# Patient Record
Sex: MALE | Race: WHITE | Employment: OTHER | URBAN - METROPOLITAN AREA
[De-identification: names, ages, dates, MRNs, and addresses within clinical notes are randomized per-mention and may not be internally consistent; named-entity substitution may affect disease eponyms.]

---

## 2019-04-22 ENCOUNTER — TRANSFERRED RECORDS (OUTPATIENT)
Dept: HEALTH INFORMATION MANAGEMENT | Facility: CLINIC | Age: 74
End: 2019-04-22

## 2019-04-24 ENCOUNTER — TRANSFERRED RECORDS (OUTPATIENT)
Dept: HEALTH INFORMATION MANAGEMENT | Facility: CLINIC | Age: 74
End: 2019-04-24

## 2019-04-25 ENCOUNTER — TRANSFERRED RECORDS (OUTPATIENT)
Dept: HEALTH INFORMATION MANAGEMENT | Facility: CLINIC | Age: 74
End: 2019-04-25

## 2019-04-26 ENCOUNTER — TRANSFERRED RECORDS (OUTPATIENT)
Dept: HEALTH INFORMATION MANAGEMENT | Facility: CLINIC | Age: 74
End: 2019-04-26

## 2019-05-08 ENCOUNTER — PRE VISIT (OUTPATIENT)
Dept: ONCOLOGY | Facility: CLINIC | Age: 74
End: 2019-05-08

## 2019-05-08 NOTE — TELEPHONE ENCOUNTER
Surgical Oncology/Hepatobiliary Surgery Referral      Referring provider: Dr. Omer - [facility]      Referred to: Dr. Dobson - Surgical oncology - Hepatobiliary Surgeon      Referral Received: 5/6/2019      Evaluation for: IPMN with mural nodule      Oncology provider (if applicable): NA        Imaging:     MRI 4/22/2019     US ABD 4/22/2019    CT 4/22/2019    CT 7/12/2012       Staging complete (if applicable): NA    Additional records needed:     Will need all outside imaging noted above and will need to obtain any EUS/ERCP reports and pathology reports ASAP.     Message sent scheduling to contact patient and arrange for a consult with Dr. Dobson on 5/15/2019 at 1 pm.

## 2019-05-08 NOTE — TELEPHONE ENCOUNTER
ONCOLOGY INTAKE: Records Information      APPT INFORMATION: 5/15/19 - Bronson Villarreal CSC  Referring provider:  Temo Omer  Referring provider s clinic:  [facility]  Reason for visit/diagnosis:  IPMN with Mural nodule     RECORDS INFORMATION:  Were the records received with the referral (via [employer])? Yes (sent to [facility])    Has patient been seen for any external appt for this diagnosis? Yes    If yes, where? [facility]    Has patient had any imaging or procedures outside of [place] for this condition? Yes      If Yes, where? [facility]    ADDITIONAL INFORMATION:  Received 31 pages of records including referral from [facility] - sent to HIM & records team

## 2019-05-10 NOTE — TELEPHONE ENCOUNTER
[facility]. Faxed urgently to HIM ([phone]).    Images resolved and viewable in PACS.

## 2019-05-10 NOTE — TELEPHONE ENCOUNTER
Received fax from intake team that looks like it was [facility] to have [facility]...but all that came through was the cover sheet. Sent an email to intake team, to verify whether they have received Alphonso's records.

## 2019-05-13 PROCEDURE — 00000346 ZZHCL STATISTIC REVIEW OUTSIDE SLIDES TC 88321: Performed by: SURGERY

## 2019-05-14 LAB — COPATH REPORT: NORMAL

## 2019-05-14 ASSESSMENT — ENCOUNTER SYMPTOMS
TINGLING: 0
SPEECH CHANGE: 0
ORTHOPNEA: 0
ARTHRALGIAS: 0
SLEEP DISTURBANCES DUE TO BREATHING: 0
JAUNDICE: 0
DIZZINESS: 0
ABDOMINAL PAIN: 1
BACK PAIN: 1
PARALYSIS: 0
NERVOUS/ANXIOUS: 0
MUSCLE CRAMPS: 0
BRUISES/BLEEDS EASILY: 1
RECTAL PAIN: 0
SEIZURES: 0
NAUSEA: 0
DISTURBANCES IN COORDINATION: 0
HYPOTENSION: 0
JOINT SWELLING: 0
BLOATING: 0
DEPRESSION: 1
LOSS OF CONSCIOUSNESS: 0
NECK PAIN: 0
SYNCOPE: 0
TREMORS: 1
PALPITATIONS: 0
VOMITING: 0
CONSTIPATION: 0
WEAKNESS: 0
HYPERTENSION: 0
PANIC: 0
DIARRHEA: 1
DECREASED CONCENTRATION: 0
INSOMNIA: 0
LEG PAIN: 0
MEMORY LOSS: 0
LIGHT-HEADEDNESS: 0
STIFFNESS: 0
HEADACHES: 0
BLOOD IN STOOL: 0
EXERCISE INTOLERANCE: 0
HEARTBURN: 0
BOWEL INCONTINENCE: 1
SWOLLEN GLANDS: 0
MUSCLE WEAKNESS: 0
NUMBNESS: 0
MYALGIAS: 0

## 2019-05-15 ENCOUNTER — PRE VISIT (OUTPATIENT)
Dept: SURGERY | Facility: CLINIC | Age: 74
End: 2019-05-15

## 2019-05-15 ENCOUNTER — OFFICE VISIT (OUTPATIENT)
Dept: SURGERY | Facility: CLINIC | Age: 74
End: 2019-05-15
Attending: SURGERY
Payer: COMMERCIAL

## 2019-05-15 VITALS
RESPIRATION RATE: 16 BRPM | HEART RATE: 68 BPM | HEIGHT: 68 IN | SYSTOLIC BLOOD PRESSURE: 140 MMHG | TEMPERATURE: 97.1 F | OXYGEN SATURATION: 98 % | DIASTOLIC BLOOD PRESSURE: 77 MMHG | WEIGHT: 175.2 LBS | BODY MASS INDEX: 26.55 KG/M2

## 2019-05-15 DIAGNOSIS — D49.0 IPMN (INTRADUCTAL PAPILLARY MUCINOUS NEOPLASM): Primary | ICD-10-CM

## 2019-05-15 PROCEDURE — G0463 HOSPITAL OUTPT CLINIC VISIT: HCPCS | Mod: ZF

## 2019-05-15 RX ORDER — OMEPRAZOLE 20 MG/1
20 TABLET, DELAYED RELEASE ORAL DAILY
COMMUNITY

## 2019-05-15 RX ORDER — SIMVASTATIN 40 MG
40 TABLET ORAL AT BEDTIME
COMMUNITY

## 2019-05-15 RX ORDER — MODAFINIL 200 MG/1
200 TABLET ORAL DAILY
COMMUNITY

## 2019-05-15 RX ORDER — MULTIPLE VITAMINS W/ MINERALS TAB 9MG-400MCG
1 TAB ORAL DAILY
COMMUNITY

## 2019-05-15 RX ORDER — PROPRANOLOL HYDROCHLORIDE 40 MG/1
40 TABLET ORAL 2 TIMES DAILY
COMMUNITY

## 2019-05-15 RX ORDER — MULTIVIT-MIN/IRON/FOLIC ACID/K 18-600-40
1000 CAPSULE ORAL DAILY
COMMUNITY

## 2019-05-15 ASSESSMENT — MIFFLIN-ST. JEOR: SCORE: 1508.17

## 2019-05-15 ASSESSMENT — PAIN SCALES - GENERAL: PAINLEVEL: NO PAIN (0)

## 2019-05-15 NOTE — NURSING NOTE
"Oncology Rooming Note    May 15, 2019 12:44 PM   Alphonso Montalvo is a 73 year old male who presents for:    Chief Complaint   Patient presents with     New Patient     NEW PT; IPMN WITH MURAL NODULE; VITALS COMPLETED BY [facility]      Initial Vitals: /77   Pulse 68   Temp 97.1  F (36.2  C) (Oral)   Resp 16   Ht 1.734 m (5' 8.25\")   Wt 79.5 kg (175 lb 3.2 oz)   SpO2 98%   BMI 26.44 kg/m   Estimated body mass index is 26.44 kg/m  as calculated from the following:    Height as of this encounter: 1.734 m (5' 8.25\").    Weight as of this encounter: 79.5 kg (175 lb 3.2 oz). Body surface area is 1.96 meters squared.  No Pain (0) Comment: Data Unavailable   No LMP for male patient.  Allergies reviewed: Yes  Medications reviewed: Yes    Medications: Medication refills not needed today.  [facility]    Clinical concerns: No new concerns today  Dr. Dobson was notified.      Ximoy Maurice              "

## 2019-05-15 NOTE — LETTER
5/15/2019     RE: Alphonso Montalvo  [address]  [facility] [address]     Dear Colleague,    Thank you for referring your patient, Alphonso Montalvo, to the [facility]. Please see a copy of my visit note below.    NEW PATIENT VISIT      REASON FOR EVALUATION:  Newly diagnosed intraductal papillary mucinous neoplasm of the pancreas, side branch variant, with mural nodule.      HISTORY OF PRESENT ILLNESS:  Mr. Motnalvo is a 73-year-old gentleman who receives care at the [facility].  He recently presented with what sounds like an acute episode of biliary colic with multiple gallstones.  He was being evaluated for that because he also developed some chest pain which initially brought him to the ER.  His heart evaluation was negative but his evaluation for the pain ultimately identified a cystic lesion in the neck of his pancreas.  The patient underwent a cholecystectomy and the pancreas was further evaluated with an MRI/MRCP as well as an endoscopic ultrasound with fine-needle aspiration.      I was contacted by Dr. Omer from Gastroenterology at the [facility] to discuss his case.  The endoscopic ultrasound reveals a less than 3 cm cystic lesion in the neck of the pancreas which does have a very small mural nodule.  The MRI on my review also shows a mural nodule.  However, the official reading does not raise concern for a vascular enhancing nodule.  I will have that film reviewed by one of our radiologists at the [facility] as well.      Mr. Montalvo is otherwise in his usual state of health and has no new complaints today.        I had a long discussion with Mr. Montalvo and his wife today regarding the findings of a side branch IPMN with mural nodule and I discussed that the mural nodule does represent a worrisome feature for the possible development of pancreatic adenocarcinoma in the future.  The difficulty is that this  "patient is already 73 years old and also does have a history of CLL and by his report to me today, he has had some progression of his disease based on his outside physician's assessment and he has been told that he may require repeat chemotherapy for that.      [facility] the [facility] was not available for my review today and we will reach out to the [facility] to see if they can send us the results of the cytology as well as the fluid amylase, CEA and mucin.        I had a long discussion with Mr. Montalvo and his wife that although there is a risk of developing cancer in this lesion, it is uncertain if or when that would happen and given his age of 73 years old, it may be overly aggressive to proceed directly to surgery.  I discussed that his surgery may include a subtotal pancreatectomy versus making efforts to do a central pancreatectomy but complications of those surgeries can include diabetes as well as pancreatic insufficiency which would certainly affect his quality of life.  At the end of our discussion, we were in agreement that a short period of surveillance may be a reasonable approach in this case.  This will also give him some time to think things over about whether he would like to proceed with surgery versus a more watch-and-wait approach.  He is clearly aware of the risk of developing pancreatic cancer at some point in the future, as we discussed that in detail.  Based on our conversation today, I will plan for a 3-month followup visit with a repeat MRI/MRCP to reassess this lesion and particularly to focus on the area concerning for a mural nodule.  The patient was quite satisfied with that and actually essentially said, \"I definitely don't want surgery in the next 3 months.\"  So, I think we are all in agreement with this plan.        I will look forward to seeing Mr. Montalvo again in 3 months' time with a repeat MRI/MRCP to assess his side branch IPMN with mural nodule.  A " total of 45 minutes was spent on this case; more than half that time was in face-to-face time with the patient and the remainder was in chart review, imaging review and coordination of his care.     Again, thank you for allowing me to participate in the care of your patient.      Sincerely,    Terrance Dobson MD

## 2019-05-15 NOTE — PROGRESS NOTES
NEW PATIENT VISIT      REASON FOR EVALUATION:  Newly diagnosed intraductal papillary mucinous neoplasm of the pancreas, side branch variant, with mural nodule.      HISTORY OF PRESENT ILLNESS:  Mr. Montalvo is a 73-year-old gentleman who receives care at the [facility].  He recently presented with what sounds like an acute episode of biliary colic with multiple gallstones.  He was being evaluated for that because he also developed some chest pain which initially brought him to the ER.  His heart evaluation was negative but his evaluation for the pain ultimately identified a cystic lesion in the neck of his pancreas.  The patient underwent a cholecystectomy and the pancreas was further evaluated with an MRI/MRCP as well as an endoscopic ultrasound with fine-needle aspiration.      I was contacted by Dr. Omer from Gastroenterology at the [facility] to discuss his case.  The endoscopic ultrasound reveals a less than 3 cm cystic lesion in the neck of the pancreas which does have a very small mural nodule.  The MRI on my review also shows a mural nodule.  However, the official reading does not raise concern for a vascular enhancing nodule.  I will have that film reviewed by one of our radiologists at the [facility] as well.      Mr. Montalvo is otherwise in his usual state of health and has no new complaints today.        I had a long discussion with Mr. Montalvo and his wife today regarding the findings of a side branch IPMN with mural nodule and I discussed that the mural nodule does represent a worrisome feature for the possible development of pancreatic adenocarcinoma in the future.  The difficulty is that this patient is already 73 years old and also does have a history of CLL and by his report to me today, he has had some progression of his disease based on his outside physician's assessment and he has been told that he may require repeat chemotherapy for  "that.      The fluid analysis from the [facility] endoscopic ultrasound was not available for my review today and we will reach out to the [facility] to see if they can send us the results of the cytology as well as the fluid amylase, CEA and mucin.        I had a long discussion with Mr. Montalvo and his wife that although there is a risk of developing cancer in this lesion, it is uncertain if or when that would happen and given his age of 73 years old, it may be overly aggressive to proceed directly to surgery.  I discussed that his surgery may include a subtotal pancreatectomy versus making efforts to do a central pancreatectomy but complications of those surgeries can include diabetes as well as pancreatic insufficiency which would certainly affect his quality of life.  At the end of our discussion, we were in agreement that a short period of surveillance may be a reasonable approach in this case.  This will also give him some time to think things over about whether he would like to proceed with surgery versus a more watch-and-wait approach.  He is clearly aware of the risk of developing pancreatic cancer at some point in the future, as we discussed that in detail.  Based on our conversation today, I will plan for a 3-month followup visit with a repeat MRI/MRCP to reassess this lesion and particularly to focus on the area concerning for a mural nodule.  The patient was quite satisfied with that and actually essentially said, \"I definitely don't want surgery in the next 3 months.\"  So, I think we are all in agreement with this plan.        I will look forward to seeing Mr. Montalvo again in 3 months' time with a repeat MRI/MRCP to assess his side branch IPMN with mural nodule.  A total of 45 minutes was spent on this case; more than half that time was in face-to-face time with the patient and the remainder was in chart review, imaging review and coordination of his care.       "

## 2019-08-18 ASSESSMENT — ENCOUNTER SYMPTOMS
BACK PAIN: 1
SPUTUM PRODUCTION: 0
ARTHRALGIAS: 0
STIFFNESS: 0
POSTURAL DYSPNEA: 0
SWOLLEN GLANDS: 0
MYALGIAS: 1
MUSCLE CRAMPS: 0
DYSPNEA ON EXERTION: 0
SINUS CONGESTION: 0
COUGH DISTURBING SLEEP: 0
SHORTNESS OF BREATH: 0
TROUBLE SWALLOWING: 0
JOINT SWELLING: 0
MUSCLE WEAKNESS: 0
TASTE DISTURBANCE: 0
NECK MASS: 0
BRUISES/BLEEDS EASILY: 1
SMELL DISTURBANCE: 0
SORE THROAT: 0
HEMOPTYSIS: 0
SINUS PAIN: 0
COUGH: 0
NECK PAIN: 0
HOARSE VOICE: 0
WHEEZING: 0
SNORES LOUDLY: 1

## 2019-08-19 ENCOUNTER — OFFICE VISIT (OUTPATIENT)
Dept: SURGERY | Facility: CLINIC | Age: 74
End: 2019-08-19
Attending: SURGERY
Payer: COMMERCIAL

## 2019-08-19 ENCOUNTER — ANCILLARY PROCEDURE (OUTPATIENT)
Dept: MRI IMAGING | Facility: CLINIC | Age: 74
End: 2019-08-19
Attending: SURGERY
Payer: COMMERCIAL

## 2019-08-19 VITALS
BODY MASS INDEX: 26.59 KG/M2 | HEART RATE: 71 BPM | WEIGHT: 179.5 LBS | DIASTOLIC BLOOD PRESSURE: 73 MMHG | TEMPERATURE: 97.5 F | RESPIRATION RATE: 16 BRPM | OXYGEN SATURATION: 98 % | HEIGHT: 69 IN | SYSTOLIC BLOOD PRESSURE: 122 MMHG

## 2019-08-19 DIAGNOSIS — D49.0 IPMN (INTRADUCTAL PAPILLARY MUCINOUS NEOPLASM): Primary | ICD-10-CM

## 2019-08-19 DIAGNOSIS — D49.0 IPMN (INTRADUCTAL PAPILLARY MUCINOUS NEOPLASM): ICD-10-CM

## 2019-08-19 PROCEDURE — G0463 HOSPITAL OUTPT CLINIC VISIT: HCPCS | Mod: ZF

## 2019-08-19 RX ORDER — GADOBUTROL 604.72 MG/ML
7.5 INJECTION INTRAVENOUS ONCE
Status: COMPLETED | OUTPATIENT
Start: 2019-08-19 | End: 2019-08-19

## 2019-08-19 RX ADMIN — GADOBUTROL 7.5 ML: 604.72 INJECTION INTRAVENOUS at 07:43

## 2019-08-19 ASSESSMENT — PAIN SCALES - GENERAL: PAINLEVEL: NO PAIN (0)

## 2019-08-19 ASSESSMENT — MIFFLIN-ST. JEOR: SCORE: 1531.65

## 2019-08-19 NOTE — PATIENT INSTRUCTIONS
Surgical Oncology RN Care Coordination Note:     - Scan looks good, no changes.     - Follow up in 6 months at the [facility] with Dr. Rodriguez with an MRI prior to the visit.     Nilsa Romero RN, BSN  Care Coordinator   [phone]

## 2019-08-19 NOTE — LETTER
8/19/2019       RE: Alphonso Montalvo  [address]  [facility] [place] [address]     Dear Colleague,    Thank you for referring your patient, Alphonso Montalvo, to the [facility]. Please see a copy of my visit note below.    ESTABLISHED PATIENT VISIT      REASON FOR EVALUATION:  Followup intraductal papillary mucinous neoplasm of the pancreas.      HISTORY OF PRESENT ILLNESS:  Mr. Montalvo presents today in his usual state of health.  He denies any new symptoms including jaundice, diarrhea, new abdominal pains, etc.  He had an MRI which reveals a 6 mm nonenhancing nodule within an IPMN with otherwise benign features.      I discussed with Mr. Montalvo and his wife today that his IPMN has not changed since previous imaging based on his new MRI report.  I discussed that, although nonenhancing nodules are a worrisome feature, they do not qualify as a high-risk feature for cancer development within IPMN, and particularly given the patient's overall age, etc, I think it is reasonable to stay with our current plan of surveillance which he was quite happy with.  I will plan to see Mr. Montalvo again in 3 months' time with a repeat MRI for reevaluation of this 6 mm nonenhancing nodule.      I discussed with Mr. Montalvo that he needs to call immediately if he develops any of the following pancreas-related issues such as jaundice, new abdominal pains, diarrhea, new diabetes or an episode of pancreatitis, which he voiced a clear understanding to.  In addition, [facility] Ranjit Calvo and I offered to Mr. Montalvo if he would prefer to [facility] he could certainly change his care to Dr. Calvo's service, which he was in favor of.  We will put in a referral for Mr. Montalvo to see Dr. Ellis Calvo in about 6 months' time with a repeat MRCP for his IPMN with a small nonenhancing nodule.      A total of 30 minutes was spent with Mr. Montalvo;  more than half that time was in face-to-face time and the remainder was in review of his radiology, etc.         Again, thank you for allowing me to participate in the care of your patient.      Sincerely,    Terrance Dobson MD

## 2019-08-19 NOTE — NURSING NOTE
"Oncology Rooming Note    August 19, 2019 11:14 AM   Alphonso Montalvo is a 73 year old male who presents for:    Chief Complaint   Patient presents with     Oncology Clinic Visit     RETURN VISIT; 3 MONTH FOLLOW UP; VITALS COMPLETED BY [facility]      Initial Vitals: /73   Pulse 71   Temp 97.5  F (36.4  C) (Oral)   Resp 16   Ht 1.74 m (5' 8.5\")   Wt 81.4 kg (179 lb 8 oz)   SpO2 98%   BMI 26.90 kg/m   Estimated body mass index is 26.9 kg/m  as calculated from the following:    Height as of this encounter: 1.74 m (5' 8.5\").    Weight as of this encounter: 81.4 kg (179 lb 8 oz). Body surface area is 1.98 meters squared.  No Pain (0) Comment: Data Unavailable   No LMP for male patient.  Allergies reviewed: Yes  Medications reviewed: Yes    Medications: Medication refills not needed today.  [facility] into [employer]    Clinical concerns: No new concerns today  Dr Dobson was notified.      Xiomy Maurice              "

## 2019-08-19 NOTE — DISCHARGE INSTRUCTIONS
MRI Contrast Discharge Instructions    The IV contrast you received today will pass out of your body in your  urine. This will happen in the next 24 hours. You will not feel this process.  Your urine will not change color.    Drink at least 4 extra glasses of water or juice today (unless your doctor  has restricted your fluids). This reduces the stress on your kidneys.  You may take your regular medicines.    If you are on dialysis: It is best to have dialysis today.    If you have a reaction: Most reactions happen right away. If you have  any new symptoms after leaving the hospital (such as hives or swelling),  call your hospital at the correct number below. Or call your family doctor.  If you have breathing distress or wheezing, call 911.    Special instructions: ***    I have read and understand the above information.    Signature:______________________________________ Date:___________    Staff:__________________________________________ Date:___________     Time:__________    [facility] Departments:    ___[place]: [phone]  ___[facility]: [phone]  ___[place]: [phone] ___[facility]: [phone]  ___[facility]: [phone]  ___[facility]: [phone]  ___[phone]  ___[facility]: [phone]  ___[phone]

## 2019-08-19 NOTE — PROGRESS NOTES
ESTABLISHED PATIENT VISIT      REASON FOR EVALUATION:  Followup intraductal papillary mucinous neoplasm of the pancreas.      HISTORY OF PRESENT ILLNESS:  Mr. Montalvo presents today in his usual state of health.  He denies any new symptoms including jaundice, diarrhea, new abdominal pains, etc.  He had an MRI which reveals a 6 mm nonenhancing nodule within an IPMN with otherwise benign features.      I discussed with Mr. Montalvo and his wife today that his IPMN has not changed since previous imaging based on his new MRI report.  I discussed that, although nonenhancing nodules are a worrisome feature, they do not qualify as a high-risk feature for cancer development within IPMN, and particularly given the patient's overall age, etc, I think it is reasonable to stay with our current plan of surveillance which he was quite happy with.  I will plan to see Mr. Montalvo again in 3 months' time with a repeat MRI for reevaluation of this 6 mm nonenhancing nodule.      I discussed with Mr. Montalvo that he needs to call immediately if he develops any of the following pancreas-related issues such as jaundice, new abdominal pains, diarrhea, new diabetes or an episode of pancreatitis, which he voiced a clear understanding to.  In addition, [facility] Ranjit Cal[facility] and I offered to Mr. Montalvo if he would [facility] he could certainly change his care to Dr. Calvo's service, which he was in favor of.  We will put in a referral for Mr. Montalvo to see Dr. Ellis Calvo in about 6 months' time with a repeat MRCP for his IPMN with a small nonenhancing nodule.      A total of 30 minutes was spent with Mr. Montalvo; more than half that time was in face-to-face time and the remainder was in review of his radiology, etc.

## 2019-10-01 ENCOUNTER — HEALTH MAINTENANCE LETTER (OUTPATIENT)
Age: 74
End: 2019-10-01

## 2019-12-15 ENCOUNTER — HEALTH MAINTENANCE LETTER (OUTPATIENT)
Age: 74
End: 2019-12-15

## 2020-08-28 DIAGNOSIS — G47.10 HYPERSOMNIA: Primary | ICD-10-CM

## 2020-09-09 VITALS — WEIGHT: 170 LBS | BODY MASS INDEX: 25.18 KG/M2 | HEIGHT: 69 IN

## 2020-09-09 ASSESSMENT — MIFFLIN-ST. JEOR: SCORE: 1483.55

## 2020-09-09 NOTE — PROGRESS NOTES
"Alphonso Montalvo is a 74 year old male who is being evaluated via a billable video visit.      The patient has been notified of following:     \"This video visit will be conducted via a call between you and your physician/provider. We have found that certain health care needs can be provided without the need for an in-person physical exam.  This service lets us provide the care you need with a video conversation.  If a prescription is necessary we can send it directly to your pharmacy.  If lab work is needed we can place an order for that and you can then stop by our lab to have the test done at a later time.    Video visits are billed at different rates depending on your insurance coverage.  Please reach out to your insurance provider with any questions.    If during the course of the call the physician/provider feels a video visit is not appropriate, you will not be charged for this service.\"    Patient has given verbal consent for Video visit? Yes  How would you like to obtain your AVS? MyChart  If you are dropped from the video visit, the video invite should be resent to: Send to e-mail at: [email or web address]  Will anyone else be joining your video visit? No        Video-Visit Details    Type of service:  Video Visit    Video Start Time: 10:03 AM  Video End Time: 10:12 AM    Originating Location (pt. Location): Home    Distant Location (provider location):  [facility]     Platform used for Video Visit: AmWell      Visit was scheduled as a consult.  Chart review reveals that patient was referred as a direct referral to be sleep lab.  [facility] and [facility] in lab polysomnography was requested.  Reviewed the notes, scanned into the media section, and will place orders for diagnostic PSG.     PSG requested was stat split-night with extra paraspinal EMG.  We are not doing split-night at this time only diagnostic due to the " pandemic.  Placing orders for diagnostic PSG with extra emg.     I reviewed this with the patient via video.  He is agreeable.  Typical bedtime is around 10 PM.  He is coming up from [place] and would prefer the [facility] location.  [facility] to [facility] appointment to review the results once the sleep study is scheduled.    Joy Price MD    Copy to :  KETAN Maldonado and Kusum Puri MD

## 2020-09-09 NOTE — PATIENT INSTRUCTIONS
Your BMI is Body mass index is 25.47 kg/m .  Weight management is a personal decision.  If you are interested in exploring weight loss strategies, the following discussion covers the approaches that may be successful. Body mass index (BMI) is one way to tell whether you are at a healthy weight, overweight, or obese. It measures your weight in relation to your height.  A BMI of 18.5 to 24.9 is in the healthy range. A person with a BMI of 25 to 29.9 is considered overweight, and someone with a BMI of 30 or greater is considered obese. More than two-thirds of American adults are considered overweight or obese.  Being overweight or obese increases the risk for further weight gain. Excess weight may lead to heart disease and diabetes.  Creating and following plans for healthy eating and physical activity may help you improve your health.  Weight control is part of healthy lifestyle and includes exercise, emotional health, and healthy eating habits. Careful eating habits lifelong are the mainstay of weight control. Though there are significant health benefits from weight loss, long-term weight loss with diet alone may be very difficult to achieve- studies show long-term success with dietary management in less than 10% of people. Attaining a healthy weight may be especially difficult to achieve in those with severe obesity. In some cases, medications, devices and surgical management might be considered.  What can you do?  If you are overweight or obese and are interested in methods for weight loss, you should discuss this with your provider.     Consider reducing daily calorie intake by 500 calories.     Keep a food journal.     Avoiding skipping meals, consider cutting portions instead.    Diet combined with exercise helps maintain muscle while optimizing fat loss. Strength training is particularly important for building and maintaining muscle mass. Exercise helps reduce stress, increase energy, and improves fitness.  Increasing exercise without diet control, however, may not burn enough calories to loose weight.       Start walking three days a week 10-20 minutes at a time    Work towards walking thirty minutes five days a week     Eventually, increase the speed of your walking for 1-2 minutes at time    In addition, we recommend that you review healthy lifestyles and methods for weight loss available through the National Institutes of Health patient information sites:  http://win.niddk.nih.gov/publications/index.htm    And look into health and wellness programs that may be available through your health insurance provider, employer, local community center, or sobia club.

## 2020-09-10 ENCOUNTER — VIRTUAL VISIT (OUTPATIENT)
Dept: SLEEP MEDICINE | Facility: CLINIC | Age: 75
End: 2020-09-10
Payer: MEDICARE

## 2020-09-10 DIAGNOSIS — G47.10 HYPERSOMNIA: Primary | ICD-10-CM

## 2020-09-10 DIAGNOSIS — G25.3 MYOCLONUS: ICD-10-CM

## 2020-09-10 NOTE — LETTER
"    9/10/2020         RE: Alphonso Montalvo  [address]  [facility] [address]        Dear Colleague,    Thank you for referring your patient, Alphonso Montalvo, to the [facility]. Please see a copy of my visit note below.    Alphonso Montalvo is a 74 year old male who is being evaluated via a billable video visit.      The patient has been notified of following:     \"This video visit will be conducted via a call between you and your physician/provider. We have found that certain health care needs can be provided without the need for an in-person physical exam.  This service lets us provide the care you need with a video conversation.  If a prescription is necessary we can send it directly to your pharmacy.  If lab work is needed we can place an order for that and you can then stop by our lab to have the test done at a later time.    Video visits are billed at different rates depending on your insurance coverage.  Please reach out to your insurance provider with any questions.    If during the course of the call the physician/provider feels a video visit is not appropriate, you will not be charged for this service.\"    Patient has given verbal consent for Video visit? Yes  How would you like to obtain your AVS? MyChart  If you are dropped from the video visit, the video invite should be resent to: Send to e-mail at: [email or web address] [email or web address]  Will anyone else be joining your video visit? No        Video-Visit Details    Type of service:  Video Visit    Video Start Time: 10:03 AM  Video End Time: 10:12 AM    Originating Location (pt. Location): Home    Distant Location (provider location):  [facility]     Platform used for Video Visit: AmWell      Visit was scheduled as a consult.  Chart review reveals that patient was referred as a direct referral to be sleep lab.  [facility] and [facility] in [facility] " polysomnography was requested.  Reviewed the notes, scanned into the media section, and will place orders for diagnostic PSG.     PSG requested was stat split-night with extra paraspinal EMG.  We are not doing split-night at this time only diagnostic due to the pandemic.  Placing orders for diagnostic PSG with extra emg.     I reviewed this with the patient via video.  He is agreeable.  Typical bedtime is around 10 PM.  He is coming up from [place] and would prefer the [facility] location.  [facility] to [facility] to review the results once the sleep study is scheduled.    Joy Price MD    Copy to :  KETAN Maldonado and Kusum Puri MD              Again, thank you for allowing me to participate in the care of your patient.        Sincerely,        Joy Price MD

## 2020-09-21 NOTE — NURSING NOTE
Pt has been scheduled for PSG. [facility] Puri.    Man Nevarez  Sleep Clinic Specialist - [place]

## 2020-10-15 ENCOUNTER — THERAPY VISIT (OUTPATIENT)
Dept: SLEEP MEDICINE | Facility: CLINIC | Age: 75
End: 2020-10-15
Payer: MEDICARE

## 2020-10-15 DIAGNOSIS — G25.3 MYOCLONUS: ICD-10-CM

## 2020-10-15 DIAGNOSIS — G47.10 HYPERSOMNIA: ICD-10-CM

## 2020-10-15 NOTE — Clinical Note
Please print off Interp and report and fax to YONY Zaragoza PA-C and Kusum STAPLES and make sure a copy get mailed to pt as well. Thanks  TC

## 2020-10-16 NOTE — PROCEDURES
" SLEEP STUDY INTERPRETATION  DIAGNOSTIC POLYSOMNOGRAPHY REPORT      Patient: CORTEZ MORRIS  YOB: 1945  Study Date: 10/15/2020  MRN: [id number]  Referring Provider: Liz Zaragoza PA-C, Kusum Puri MD ([facility])  Ordering Provider: MD Joy Price    Indications for Polysomnography: The patient is a 74 year old Male who is 5' 8\" and weighs 170.0 lbs. His BMI is 26.1, [place] sleepiness scale 0 and neck circumference is 0 cm. Relevant medical history includes chronic leukemia, hepatitis C, depression. A diagnostic polysomnogram was performed to evaluate for sleep apnea/PLMS/myoclonus.    Polysomnogram Data: A full night polysomnogram recorded the standard physiologic parameters including EEG, EOG, EMG, ECG, nasal and oral airflow. Respiratory parameters of chest and abdominal movements were recorded with respiratory inductance plethysmography. Oxygen saturation was recorded by pulse oximetry. Hypopnea scoring rule used: 1B 4%.    Sleep Architecture: Decreased sleep efficiency with increased arousal index, increased wake after sleep onset, and delay to REM sleep.  The total recording time of the polysomnogram was 488.2 minutes. The total sleep time was 296.0 minutes. Sleep latency was normal at 24.6 minutes without the use of a sleep aid. REM latency was delayed at 422.0 minutes. Arousal index was increased at 57.8 arousals per hour. Sleep efficiency was decreased at 60.6%. Wake after sleep onset was 167.0 minutes. The patient spent 31.6% of total sleep time in Stage N1, 46.1% in Stage N2, 21.1% in Stage N3, and 1.2% in REM. Time in REM supine was - minutes.    Respiration: Overall mild supine predominant obstructive sleep apnea.    Events ? The polysomnogram revealed a presence of 35 obstructive, 3 central, and 5 mixed apneas resulting in an apnea index of 8.7 events per hour. There were 13 obstructive hypopneas and - central hypopneas resulting in an obstructive hypopnea index of 2.6 and central " hypopnea index of - events per hour. The combined apnea/hypopnea index was 11.4 events per hour (central apnea/hypopnea index was 0.6 events per hour). The REM AHI was - events per hour. The supine AHI was 38.8 events per hour. The RERA index was 5.5 events per hour.  The RDI was 16.8 events per hour.    Snoring - was reported as moderate.    Respiratory rate and pattern - was notable for normal respiratory rate and pattern.    Sustained Sleep Associated Hypoventilation - Transcutaneous carbon dioxide monitoring was not used, however significant hypoventilation was not suggested by oximetry.    Sleep Associated Hypoxemia - (Greater than 5 minutes O2 sat at or below 88%) was not present. Baseline oxygen saturation was 95.2%. Lowest oxygen saturation was 88.8%. Time spent less than or equal to 88% was 0 minutes. Time spent less than or equal to 89% was 0 minutes.    Movement Activity: Frequent periodic limb movements associated with arousals. Paraspinal EMG unremarkable.    Periodic Limb Activity - There were 481 PLMs during the entire study. The PLM index was 97.5 movements per hour. The PLM Arousal Index was 17.0 per hour.    REM EMG Activity - Insufficient REM to assess.    Nocturnal Behavior - Abnormal sleep related behaviors were not noted.    Bruxism - None apparent.    Cardiac Summary: Normal sinus rhythm and rates.  The average pulse rate was 67.3 bpm. The minimum pulse rate was 55.5 bpm while the maximum pulse rate was 85.1 bpm.  Arrhythmias were not noted.          Assessment:     Mild supine predominant obstructive sleep apnea with AHI 11.4 events per hour.    Supine AHI 38.8 events per hour.    Decreased sleep efficiency with increased arousal index, increased wake after sleep onset, and delay to REM sleep.    Frequent periodic limb movements associated with arousals.     Paraspinal EMG unremarkable.    Normal sinus rhythm and rates.    Recommendations:    Based on the presence of mild obstructive sleep  apnea and clinical indication, treatment could be empirically initiated with Auto?titrating PAP therapy with a range of 5 to 15 cmH2O. Recommend clinical follow up with sleep management team.    Patient may be a candidate for dental appliance through referral to Sleep Dentistry for the treatment of obstructive sleep apnea.    Encourage non-supine sleep positions    Advice regarding the risks of drowsy driving.    Suggest optimizing sleep schedule and avoiding sleep deprivation.    Pharmacologic therapy should be used for management of restless legs syndrome only if present and clinically indicated and not based on the presence of periodic limb movements alone.    Diagnostic Codes:   Obstructive Sleep Apnea G47.33  Periodic Limb Movement Disorder G47.61       _____________________________________   Electronically Signed By: Joy Price MD 10/16/2020

## 2020-10-19 LAB — SLPCOMP: NORMAL

## 2020-11-17 VITALS — HEIGHT: 69 IN | WEIGHT: 170 LBS | BODY MASS INDEX: 25.18 KG/M2

## 2020-11-17 ASSESSMENT — MIFFLIN-ST. JEOR: SCORE: 1483.61

## 2020-11-17 NOTE — PROGRESS NOTES
"Alphonso Montalvo is a 74 year old male who is being evaluated via a billable video visit.      The patient has been notified of following:     \"This video visit will be conducted via a call between you and your physician/provider. We have found that certain health care needs can be provided without the need for an in-person physical exam.  This service lets us provide the care you need with a video conversation.  If a prescription is necessary we can send it directly to your pharmacy.  If lab work is needed we can place an order for that and you can then stop by our lab to have the test done at a later time.    Video visits are billed at different rates depending on your insurance coverage.  Please reach out to your insurance provider with any questions.    If during the course of the call the physician/provider feels a video visit is not appropriate, you will not be charged for this service.\"    Patient has given verbal consent for Video visit? Yes  How would you like to obtain your AVS? MyChart  If you are dropped from the video visit, the video invite should be resent to: Text to cell phone: [phone]  Will anyone else be joining your video visit? No        Video-Visit Details    Type of service:  Video Visit    Video Start Time: 9:31 AM  Video End Time: 9:50 AM    Originating Location (pt. Location): Home    Distant Location (provider location):  [facility] [place] Office    Platform used for Video Visit: Chente      Chief complaint: Follow-up sleep study    History of present illness: 74-year-old gentleman with history of chronic lymphocytic leukemia, excessive daytime sleepiness and frequent body movements at night.  He gets his care at the [facility].  He was referred for a sleep study to be done with extra monitoring of muscle activity.  [facility] however he is scheduled here today for follow-up.  Those results were reviewed " "with him and are summarized as follows:  Study date 10/15/2020  Decreased sleep efficiency with increased arousal index, increased wake after sleep onset, and delayed to REM sleep.  Overall mild supine predominant obstructive sleep apnea with AHI 11.4, supine AHI 38.8  Moderate snoring  Lowest oxygen saturation 89%  Frequent periodic limb movements associated with arousals however paraspinal EMG unremarkable  PLM index 97, PLM arousal index 17  There was insufficient REM sleep obtained to it evaluate for abnormal muscle tone in REM      ESS 12  TERESO 12    Past medical history, social history, medications reviewed.    Exam:  Ht 1.74 m (5' 8.5\")   Wt 77.1 kg (170 lb)   BMI 25.47 kg/m    GENERAL: Healthy, alert and no distress  EYES: Eyes grossly normal to inspection.  No discharge or erythema, or obvious scleral/conjunctival abnormalities.  RESP: No audible wheeze, cough, or visible cyanosis.  No visible retractions or increased work of breathing.    SKIN: Visible skin clear. No significant rash, abnormal pigmentation or lesions.  NEURO: Cranial nerves grossly intact.  Mentation and speech appropriate for age.  PSYCH: Mentation appears normal, affect normal, judgement and insight intact, normal speech and appearance well-groomed.    Assessment: 74-year-old gentleman with history of chronic lymphocytic leukemia, hypersomnia on stimulants, etc. overall mild supine predominant obstructive sleep apnea and frequent periodic limb movements.  No evidence on one night sleep study for excessive paraspinal myoclonus.    Plan: Encourage patient to follow-up with his primary sleep team at the [facility].  However in the meantime recommended restricting sleep to nonsupine positions.  Also encouraged him to consider a trial of auto titrating CPAP.  Answered questions about drug-induced sleep endoscopy which I did not recommend.  Patient to get further recommendations through [facility] sleep providers.    "

## 2020-11-17 NOTE — PATIENT INSTRUCTIONS
I will asked [facility] from the [facility] to reach out to you to schedule a follow-up.  In the meantime please try to sleep on your sides as much as possible.  And think about a trial of CPAP.

## 2020-11-18 ENCOUNTER — VIRTUAL VISIT (OUTPATIENT)
Dept: SLEEP MEDICINE | Facility: CLINIC | Age: 75
End: 2020-11-18
Payer: MEDICARE

## 2020-11-18 DIAGNOSIS — G47.10 HYPERSOMNIA: Primary | ICD-10-CM

## 2020-11-18 DIAGNOSIS — G25.3 MYOCLONUS: ICD-10-CM

## 2020-11-18 PROCEDURE — 99213 OFFICE O/P EST LOW 20 MIN: CPT | Mod: 95 | Performed by: INTERNAL MEDICINE

## 2020-11-18 NOTE — LETTER
"    11/18/2020        RE: Alphonso Montalvo  [address]  [facility] [address]        Alphonso Montalvo is a 74 year old male who is being evaluated via a billable video visit.      The patient has been notified of following:     \"This video visit will be conducted via a call between you and your physician/provider. We have found that certain health care needs can be provided without the need for an in-person physical exam.  This service lets us provide the care you need with a video conversation.  If a prescription is necessary we can send it directly to your pharmacy.  If lab work is needed we can place an order for that and you can then stop by our lab to have the test done at a later time.    Video visits are billed at different rates depending on your insurance coverage.  Please reach out to your insurance provider with any questions.    If during the course of the call the physician/provider feels a video visit is not appropriate, you will not be charged for this service.\"    Patient has given verbal consent for Video visit? Yes  How would you like to obtain your AVS? MyChart  If you are dropped from the video visit, the video invite should be resent to: Text to cell phone: [phone]  Will anyone else be joining your video visit? No        Video-Visit Details    Type of service:  Video Visit    Video Start Time: 9:31 AM  Video End Time: 9:50 AM    Originating Location (pt. Location): [place]    Distant Location (provider location):  [facility] / [place] Office    Platform used for Video Visit: SSEV      Chief complaint: Follow-up sleep study    History of present illness: 74-year-old gentleman with history of chronic lymphocytic leukemia, excessive daytime sleepiness and frequent body movements at night.  He gets his care at the [facility].  He was referred for a sleep study to be done with extra monitoring of muscle activity.  Those results were to be " [facility] however he is scheduled here today for follow-up.  Those results were reviewed with him and are summarized as follows:  Study date 10/15/2020  Decreased sleep efficiency with increased arousal index, increased wake after sleep onset, and delayed to REM sleep.  Overall mild supine predominant obstructive sleep apnea with AHI 11.4, supine AHI 38.8  Moderate snoring  Lowest oxygen saturation 89%  Frequent periodic limb movements associated with arousals however paraspinal EMG unremarkable  PLM index 97, PLM arousal index 17  There was insufficient REM sleep obtained to it evaluate for abnormal muscle tone in REM      ESS 12  TERESO 12    Past medical history, social history, medications reviewed.    Exam:  Ht 1.74 m (5' 8.5\")   Wt 77.1 kg (170 lb)   BMI 25.47 kg/m    GENERAL: Healthy, alert and no distress  EYES: Eyes grossly normal to inspection.  No discharge or erythema, or obvious scleral/conjunctival abnormalities.  RESP: No audible wheeze, cough, or visible cyanosis.  No visible retractions or increased work of breathing.    SKIN: Visible skin clear. No significant rash, abnormal pigmentation or lesions.  NEURO: Cranial nerves grossly intact.  Mentation and speech appropriate for age.  PSYCH: Mentation appears normal, affect normal, judgement and insight intact, normal speech and appearance well-groomed.    Assessment: 74-year-old gentleman with history of chronic lymphocytic leukemia, hypersomnia on stimulants, etc. overall mild supine predominant obstructive sleep apnea and frequent periodic limb movements.  No evidence on one night sleep study for excessive paraspinal myoclonus.    Plan: Encourage patient to follow-up with his primary sleep team at the [facility].  However in the meantime recommended restricting sleep to nonsupine positions.  Also encouraged him to consider a trial of auto titrating CPAP.  Answered questions about drug-induced sleep endoscopy which I did not " recommend.  Patient to get further recommendations through his [facility] sleep providers.          Sincerely,        Joy Price MD

## 2020-12-08 NOTE — NURSING NOTE
[facility] and copy has been mailed to ptCARLOS Mcknight

## 2021-01-15 ENCOUNTER — HEALTH MAINTENANCE LETTER (OUTPATIENT)
Age: 76
End: 2021-01-15

## 2021-09-04 ENCOUNTER — HEALTH MAINTENANCE LETTER (OUTPATIENT)
Age: 76
End: 2021-09-04

## 2022-01-01 ENCOUNTER — HEALTH MAINTENANCE LETTER (OUTPATIENT)
Age: 77
End: 2022-01-01

## 2022-02-13 ENCOUNTER — HEALTH MAINTENANCE LETTER (OUTPATIENT)
Age: 77
End: 2022-02-13

## 2023-01-01 ENCOUNTER — HEALTH MAINTENANCE LETTER (OUTPATIENT)
Age: 78
End: 2023-01-01